# Patient Record
Sex: FEMALE
[De-identification: names, ages, dates, MRNs, and addresses within clinical notes are randomized per-mention and may not be internally consistent; named-entity substitution may affect disease eponyms.]

---

## 2024-03-14 ENCOUNTER — HOSPITAL ENCOUNTER (OUTPATIENT)
Facility: HOSPITAL | Age: 39
Setting detail: RECURRING SERIES
Discharge: HOME | End: 2024-03-17
Payer: OTHER GOVERNMENT

## 2024-03-14 PROCEDURE — 97110 THERAPEUTIC EXERCISES: CPT

## 2024-03-14 PROCEDURE — 97535 SELF CARE MNGMENT TRAINING: CPT

## 2024-03-14 PROCEDURE — 97162 PT EVAL MOD COMPLEX 30 MIN: CPT

## 2024-03-14 NOTE — PROGRESS NOTES
PHYSICAL / OCCUPATIONAL THERAPY - DAILY TREATMENT NOTE     Patient Name: Idalmis Flanagan    Date: 3/14/2024    : 1985  Insurance: Payor:  EAST / Plan: [facility] / Product Type: *No Product type* /      Patient  verified Yes     Visit #   Current / Total 1 24   Time   In / Out 11:15 11:50   Pain   In / Out 3/10 with raise 0/10   Subjective Functional Status/Changes: See POC   Changes to:  Allergies, Med Hx, Sx Hx?   no       TREATMENT AREA =  Left knee pain [M25.562]    OBJECTIVE  Therapeutic Procedures:  Tx Min Billable or 1:1 Min (if diff from Tx Min) Procedure, Rationale, Specifics   15  71422 Therapeutic Exercise (timed):  increase ROM, strength, coordination, balance, and proprioception to improve patient's ability to progress to PLOF and address remaining functional goals. (see flow sheet as applicable)    Details if applicable:       8  31837 Self Care/Home Management (timed):  improve patient knowledge and understanding of pain reducing techniques, positioning, posture/ergonomics, home safety, activity modification, diagnosis/prognosis, and physical therapy expectations, procedures and progression  to improve patient's ability to progress to PLOF and address remaining functional goals.  (see flow sheet as applicable)    Details if applicable:     15  [facility] Totals Reminder: bill using total billable min of TIMED therapeutic procedures (example: do not include dry needle or estim unattended, both untimed codes, in totals to left)  8-22 min = 1 unit; 23-37 min = 2 units; 38-52 min = 3 units; 53-67 min = 4 units; 68-82 min = 5 units   Total Total     TOTAL TREATMENT TIME:        35     [x]  Patient Education billed concurrently with other procedures   [x] Review HEP    [] Progressed/Changed HEP, detail:    [] Other detail:       Objective Information/Functional Measures/Assessment    See IE    Patient will continue to benefit from skilled PT / OT services to modify and progress therapeutic

## 2024-03-14 NOTE — PROGRESS NOTES
[facility]  [address] [facility] [address] [place] [address] Ph:[phone] Fx: [phone]    PLAN OF CARE/ Statement of Necessity for Physical Therapy Services           Patient name: Idalmis Flanagan Start of Care: 3/14/2024   Referral source: Maria L Perez PA : 1985    Medical Diagnosis: Left knee pain [M25.562]       Onset Date: 2012   Treatment Diagnosis: M25.562  LEFT KNEE PAIN                                      Prior Hospitalization: see medical history Provider#: [id number]   Medications: Verified on Patient Summary List     Comorbidities: BMI > 30, hx left knee surgery, hx left 5th met fx.  Prior Level of Function: The patient reports that she has had chronic difficulty and pain walking.    The Plan of Care and following information is based on the information from the initial evaluation.    Assessment / key information:  The patient is a 38 year old female with a chief complaint of left knee pain that began with about in . She has had occasions of patella subluxations and 2 surgeries to address her knee, with the second including a micro fracture and lateral retinaculum release per patient. She has had cortisone injections and Synvisc with out benefit. The patient has pain through the lateral aspect of her patella with notable everted patella position in knee extension. The patient has impairments consisting of pain, decreased ROM, decreased flexibility, decreased strength, limited ADL ease, and decreased quality of life. The patient will benefit from skilled PT in order to address the aforementioned impairments.    Evaluation Complexity:  History:  MEDIUM  Complexity : 1-2 comorbidities / personal factors will impact the outcome/ POC ; Examination:  MEDIUM Complexity : 3 Standardized tests and measures addressin body structure, function, activity limitation and / or participation in recreation  ;Presentation:  MEDIUM Complexity

## 2024-03-18 ENCOUNTER — HOSPITAL ENCOUNTER (OUTPATIENT)
Facility: HOSPITAL | Age: 39
Setting detail: RECURRING SERIES
Discharge: HOME | End: 2024-03-21
Payer: OTHER GOVERNMENT

## 2024-03-18 PROCEDURE — 97112 NEUROMUSCULAR REEDUCATION: CPT

## 2024-03-18 PROCEDURE — 97110 THERAPEUTIC EXERCISES: CPT

## 2024-03-18 PROCEDURE — 97530 THERAPEUTIC ACTIVITIES: CPT

## 2024-03-18 NOTE — PROGRESS NOTES
Exercise (timed):  increase ROM, strength, coordination, balance, and proprioception to improve patient's ability to progress to PLOF and address remaining functional goals. (see flow sheet as applicable)    Details if applicable:       8  17590 Neuromuscular Re-Education (timed):  improve balance, coordination, kinesthetic sense, posture, core stability and proprioception to improve patient's ability to develop conscious control of individual muscles and awareness of position of extremities in order to progress to PLOF and address remaining functional goals. (see flow sheet as applicable)    Details if applicable:     8  86288 Therapeutic Activity (timed):  use of dynamic activities replicating functional movements to increase ROM, strength, coordination, balance, and proprioception in order to improve patient's ability to progress to PLOF and address remaining functional goals.  (see flow sheet as applicable)     Details if applicable:     30  [facility] Totals Reminder: bill using total billable min of TIMED therapeutic procedures (example: do not include dry needle or estim unattended, both untimed codes, in totals to left)  8-22 min = 1 unit; 23-37 min = 2 units; 38-52 min = 3 units; 53-67 min = 4 units; 68-82 min = 5 units   Total Total     TOTAL TREATMENT TIME:        40     [x]  Patient Education billed concurrently with other procedures   [x] Review HEP    [] Progressed/Changed HEP, detail:    [] Other detail:       Objective Information/Functional Measures/Assessment    Initiated exercises as per flow sheet.     Patient will continue to benefit from skilled PT / OT services to modify and progress therapeutic interventions, analyze and address functional mobility deficits, analyze and address ROM deficits, analyze and address strength deficits, analyze and address soft tissue restrictions, analyze and cue for proper movement patterns, analyze and modify for postural abnormalities, and analyze and address

## 2024-03-25 ENCOUNTER — APPOINTMENT (OUTPATIENT)
Facility: HOSPITAL | Age: 39
End: 2024-03-25
Payer: OTHER GOVERNMENT

## 2024-03-27 ENCOUNTER — HOSPITAL ENCOUNTER (OUTPATIENT)
Facility: HOSPITAL | Age: 39
Setting detail: RECURRING SERIES
Discharge: HOME | End: 2024-03-30
Payer: OTHER GOVERNMENT

## 2024-03-27 PROCEDURE — 97530 THERAPEUTIC ACTIVITIES: CPT

## 2024-03-27 PROCEDURE — 97112 NEUROMUSCULAR REEDUCATION: CPT

## 2024-03-27 PROCEDURE — 97110 THERAPEUTIC EXERCISES: CPT

## 2024-03-27 NOTE — PROGRESS NOTES
8:32 AM    Future Appointments   Date Time Provider Department Center   4/1/2024 11:50 AM [facility]   4/3/2024  7:50 AM L[facility] Jim[facility], [facility]   4/8/2024  8:30 AM [facility]   4/10/2024  9:50 AM [facility]

## 2024-04-01 ENCOUNTER — HOSPITAL ENCOUNTER (OUTPATIENT)
Facility: HOSPITAL | Age: 39
Setting detail: RECURRING SERIES
Discharge: HOME | End: 2024-04-04
Payer: OTHER GOVERNMENT

## 2024-04-01 PROCEDURE — 97530 THERAPEUTIC ACTIVITIES: CPT

## 2024-04-01 PROCEDURE — 97110 THERAPEUTIC EXERCISES: CPT

## 2024-04-01 NOTE — PROGRESS NOTES
PHYSICAL / OCCUPATIONAL THERAPY - DAILY TREATMENT NOTE     Patient Name: Idalmis Flanagan    Date: 2024    : 1985  Insurance: Payor:  EAST / Plan: [employer] / Product Type: *No Product type* /      Patient  verified Yes     Visit #   Current / Total 4 24   Time   In / Out 11:49 12:10   Pain   In / Out 5/10 5/10   Subjective Functional Status/Changes: The patient feels she is in more pain most days, and feels her knee is hurting worse since starting therapy. She states [place] [employer] [place] and this caused pain as well.   Changes to:  Allergies, Med Hx, Sx Hx?   no       TREATMENT AREA =  Left knee pain [M25.562]    OBJECTIVE  Therapeutic Procedures:  Tx Min Billable or 1:1 Min (if diff from Tx Min) Procedure, Rationale, Specifics   11  93448 Therapeutic Exercise (timed):  increase ROM, strength, coordination, balance, and proprioception to improve patient's ability to progress to PLOF and address remaining functional goals. (see flow sheet as applicable)    Details if applicable:       10  30589 Therapeutic Activity (timed):  use of dynamic activities replicating functional movements to increase ROM, strength, coordination, balance, and proprioception in order to improve patient's ability to progress to PLOF and address remaining functional goals.  (see flow sheet as applicable)     Details if applicable:     21  [facility] Totals Reminder: bill using total billable min of TIMED therapeutic procedures (example: do not include dry needle or estim unattended, both untimed codes, in totals to left)  8-22 min = 1 unit; 23-37 min = 2 units; 38-52 min = 3 units; 53-67 min = 4 units; 68-82 min = 5 units   Total Total     TOTAL TREATMENT TIME:        21     [x]  Patient Education billed concurrently with other procedures   [x] Review HEP    [] Progressed/Changed HEP, detail:    [] Other detail:       Objective Information/Functional Measures/Assessment    Quad strength:

## 2024-04-01 NOTE — PROGRESS NOTES
[facility]  [address]  [place] [address]  [phone] fax    Physical Therapy Discharge Summary  Patient name: Idalmis Flanagan Start of Care: 3/14/2024   Referral source: Maria L Perez PA : 1985               Medical Diagnosis: Left knee pain [M25.562]        Onset Date: 2012   Treatment Diagnosis: M25.562  LEFT KNEE PAIN                                      Prior Hospitalization: see medical history Provider#: [id number]   Medications: Verified on Patient Summary List      Comorbidities: BMI > 30, hx left knee surgery, hx left 5th met fx.  Prior Level of Function: The patient reports that she has had chronic difficulty and pain walking.  Visits from Start of Care: 4    Missed Visits: 0    Reporting Period : 3/14/2024 to 2024    Goals/Measure of Progress:  Short Term Goals: To be accomplished in 2 weeks  The patient will demonstrate independence and compliance with HEP to maximize therapeutic benefit.              IE: issued HEP              Current: Pt reports compliance.2024  The patient will improve knee extension actively to neutral with no more than 2/10 pain in order to improve ease of ambulation.              IE: neutral              D/C: remains neutral though with pain  Long Term Goals: To be accomplished in 12 weeks  The patient will improve FOTO score to 64 to improve quality of life.              IE: 54  The patient will improve hip ABD/EXT strength of left hip to 5/5 MMT to maximize stability in stance.              IE: 3+/5 MMT, 4-/5 MMT              D/C: ABD: 4/5 MMT  EXT: 4/5 MMT  The patient will demonstrate normalized gait without antalgia in order to improve ease of ambulation efficiency.              IE: antalgic gait  The patient will improve left quad strength to 5/5 MMT to maximize stability in stance.              IE: 4/5 MMT with pain              D/C: Quad strength: 4/5 MMT with

## 2024-04-03 ENCOUNTER — APPOINTMENT (OUTPATIENT)
Facility: HOSPITAL | Age: 39
End: 2024-04-03
Payer: OTHER GOVERNMENT

## 2024-04-08 ENCOUNTER — APPOINTMENT (OUTPATIENT)
Facility: HOSPITAL | Age: 39
End: 2024-04-08
Payer: OTHER GOVERNMENT

## 2024-04-10 ENCOUNTER — APPOINTMENT (OUTPATIENT)
Facility: HOSPITAL | Age: 39
End: 2024-04-10
Payer: OTHER GOVERNMENT